# Patient Record
Sex: MALE | Race: WHITE | NOT HISPANIC OR LATINO | Employment: FULL TIME | URBAN - METROPOLITAN AREA
[De-identification: names, ages, dates, MRNs, and addresses within clinical notes are randomized per-mention and may not be internally consistent; named-entity substitution may affect disease eponyms.]

---

## 2021-10-22 ENCOUNTER — HOSPITAL ENCOUNTER (EMERGENCY)
Facility: CLINIC | Age: 46
Discharge: LEFT BEFORE DISCHARGE | End: 2021-10-22
Attending: EMERGENCY MEDICINE
Payer: COMMERCIAL

## 2021-10-22 VITALS
DIASTOLIC BLOOD PRESSURE: 116 MMHG | TEMPERATURE: 97.4 F | HEART RATE: 112 BPM | OXYGEN SATURATION: 97 % | HEIGHT: 71 IN | WEIGHT: 235 LBS | SYSTOLIC BLOOD PRESSURE: 141 MMHG | BODY MASS INDEX: 32.9 KG/M2 | RESPIRATION RATE: 20 BRPM

## 2021-10-22 ASSESSMENT — MIFFLIN-ST. JEOR: SCORE: 1978.08

## 2021-10-22 NOTE — ED TRIAGE NOTES
Pt picked up from [facility] Arrived there today. Last drink was yesterday. Today had onset of RLQ abdominal pain and dizziness. Was given Valium 5mg. Hx of pancreatitis. Also reports a cough with blood tinged sputum.

## 2021-11-25 ENCOUNTER — HOSPITAL ENCOUNTER (EMERGENCY)
Facility: CLINIC | Age: 46
Discharge: HOME | End: 2021-11-25
Attending: EMERGENCY MEDICINE | Admitting: EMERGENCY MEDICINE
Payer: COMMERCIAL

## 2021-11-25 VITALS
RESPIRATION RATE: 18 BRPM | HEART RATE: 108 BPM | TEMPERATURE: 98.3 F | SYSTOLIC BLOOD PRESSURE: 129 MMHG | WEIGHT: 243 LBS | DIASTOLIC BLOOD PRESSURE: 81 MMHG | OXYGEN SATURATION: 100 % | HEIGHT: 70 IN | BODY MASS INDEX: 34.79 KG/M2

## 2021-11-25 DIAGNOSIS — F10.10 NONDEPENDENT ALCOHOL ABUSE, EPISODIC DRINKING BEHAVIOR: ICD-10-CM

## 2021-11-25 LAB
ALCOHOL BREATH TEST: 0.03 (ref 0–0.01)
AMPHETAMINES UR QL SCN: NORMAL
BARBITURATES UR QL: NORMAL
BENZODIAZ UR QL: NORMAL
CANNABINOIDS UR QL SCN: NORMAL
COCAINE UR QL: NORMAL
ETHANOL UR QL SCN: NORMAL
OPIATES UR QL SCN: NORMAL

## 2021-11-25 PROCEDURE — 99282 EMERGENCY DEPT VISIT SF MDM: CPT | Performed by: EMERGENCY MEDICINE

## 2021-11-25 PROCEDURE — 80307 DRUG TEST PRSMV CHEM ANLYZR: CPT | Performed by: EMERGENCY MEDICINE

## 2021-11-25 PROCEDURE — 99283 EMERGENCY DEPT VISIT LOW MDM: CPT | Performed by: EMERGENCY MEDICINE

## 2021-11-25 RX ORDER — OMEPRAZOLE 20 MG/1
20 TABLET, DELAYED RELEASE ORAL DAILY
COMMUNITY

## 2021-11-25 ASSESSMENT — ENCOUNTER SYMPTOMS
HEADACHES: 1
FEVER: 0
COUGH: 0
SHORTNESS OF BREATH: 0
ABDOMINAL PAIN: 0
DYSURIA: 0
VOMITING: 0
NAUSEA: 1

## 2021-11-25 ASSESSMENT — MIFFLIN-ST. JEOR: SCORE: 1993.49

## 2021-11-25 NOTE — DISCHARGE INSTRUCTIONS
Please  keep your appointment for intake/treatment on December 7.   Please return the emergency department if you develop withdrawal symptoms including shakiness, sweating, nausea, vomiting, any seizures, any other concerns.  You can also call any of the numbers on the list provided to inquire about treatment.

## 2021-11-25 NOTE — ED PROVIDER NOTES
"    [facility] [place] ([facility])       11/25/21  History     Chief Complaint   Patient presents with     Alcohol Problem     Here for detox from alcohol, drinks 6 pack of beer daily, last drink this morning     The history is provided by the patient and medical records.   The patient's mother is also here with him and she provides some of the history as well.    Imtiaz Garcia is a 46 year old male with a past medical history significant for alcohol abuse, pancreatitis, diabetes mellitus type 2, GERD, hypertension and anxiety who presents to the Emergency Department for evaluation of alcohol intoxication requesting detox.      Patient reports that he got out of alcohol treatment on 11/22.  He states that he relapsed yesterday and his first drink was yesterday and his last drink was this morning.  He notes that he drinks about 6 beers and vodka a day. He says that he drank this morning by choice and not to treat withdrawal symptoms.  He states that he has never had withdrawal symptoms in the past including no history of shaking, alcohol withdrawal seizures, DTs or vomiting.  When asked if the patient is requesting detox today, patient replied with \"I guess\".  He says that he has been to treatment twice in the past; once in March 2021 and again this month (November).  He adds that he had detox treatment in March and had no withdrawal symptoms at this time.  Patient notes that he was treated at \"[facility]\" located in [place], [place] and endorses that he he would not like to go back to this facility. Patient's mother in the room stated patient had a headache and nausea this morning, and patient states that the headache has resolved and he feels good while in the ED today.  Patient denies suicidal ideation and homicidal ideation.  Patient denies other drug use.  Patient denies fever, cough, chest pain, abdominal pain, vomiting, and chills.    Past Medical History:   Diagnosis Date     Substance " "abuse (H)        Past Surgical History:   Procedure Laterality Date     CHOLECYSTECTOMY         No family history on file.    Social History     Tobacco Use     Smoking status: Current Every Day Smoker     Packs/day: 1.00     Smokeless tobacco: Never Used   Substance Use Topics     Alcohol use: Yes     Alcohol/week: 6.0 standard drinks     Types: 6 Cans of beer per week       No current facility-administered medications for this encounter.     Current Outpatient Medications   Medication     omeprazole (PRILOSEC OTC) 20 MG EC tablet      No Known Allergies     I have reviewed the Medications, Allergies, Past Medical and Surgical History, and Social History in the Epic system.    Review of Systems   Constitutional: Negative for fever.   Respiratory: Negative for cough and shortness of breath.    Cardiovascular: Negative for chest pain.   Gastrointestinal: Positive for nausea. Negative for abdominal pain and vomiting.   Genitourinary: Negative for dysuria.   Neurological: Positive for headaches (resolved).   All other systems reviewed and are negative.    A complete review of systems was performed with pertinent positives and negatives noted in the HPI, and all other systems negative.    Physical Exam   BP: (!) 148/88  Pulse: 116  Temp: 98.3  F (36.8  C)  Resp: 16  Height: 177.8 cm (5' 10\")  Weight: 110.2 kg (243 lb)  SpO2: 97 %      Physical Exam  GEN:  Well developed, no acute distress  HEENT:  EOMI, Mucous membranes are moist.   Cardio:  RRR, no murmur, radial pulses equal bilaterally  PULM:  Lungs clear, good air movement, no wheezes, rales   Abd:  Soft, normal bowel sounds, no focal tenderness  Musculoskeletal:  normal range of motion, no lower extremity swelling or calf tenderness  Neuro:  Alert and oriented X3, Follows commands, moving all extremities spontaneously   Skin:  Warm, dry  Psychiatric: Normal mood and affect, denies suicidal ideation, homicidal ideation, auditory or visual hallucinations      ED " Course     At 2:46 PM the patient was seen and examined by Enriqueta Stubbs MD in Room ED09.        Procedures         Patient reports that he really does not get any withdrawal symptoms.  Is possible that he has some mild withdrawal given his slight tachycardia, however, patient is not noticing this.  He is not really interested in detox, he is looking more for treatment options.  He was given the option to see the  so that they can do the assessment and make an appointment for him for chemical dependency intake, however, the patient is declining the assessment.         Results for orders placed or performed during the hospital encounter of 11/25/21 (from the past 24 hour(s))   Alcohol breath test POCT   Result Value Ref Range    Alcohol Breath Test 0.026 (A) 0.00 - 0.01     Medications - No data to display          Assessments & Plan (with Medical Decision Making)   Patient presents with his mother for alcohol use.  He reports that he does not have any withdrawal symptoms, so would not qualify for inpatient detox.  Patient was advised to follow-up with one of the treatment programs on the list given to him today.  Patient's mother was given a second copy of the list as well.  Patient tells me he actually has an intake appointment for treatment on December 7.  Patient has no other complaints or concerns today.    I have reviewed the nursing notes.    I have reviewed the findings, diagnosis, plan and need for follow up with the patient.    New Prescriptions    No medications on file       Final diagnoses:   Nondependent alcohol abuse, episodic drinking behavior       IJudit am serving as a trained medical scribe to document services personally performed by Enriqueta Stubbs MD, based on the provider's statements to me.      I, Enriqueta Stubbs MD, was physically present and have reviewed and verified the accuracy of this note documented by Judit Aguirre.     Enriqueta Kemp  MD [place]  11/25/2021   [facility] EMERGENCY DEPARTMENT     Enriqueta Stubbs MD  11/25/21 1525

## 2021-12-12 LAB
ATRIAL RATE - MUSE: 108 BPM
DIASTOLIC BLOOD PRESSURE - MUSE: NORMAL MMHG
INTERPRETATION ECG - MUSE: NORMAL
P AXIS - MUSE: 51 DEGREES
PR INTERVAL - MUSE: 160 MS
QRS DURATION - MUSE: 78 MS
QT - MUSE: 344 MS
QTC - MUSE: 460 MS
R AXIS - MUSE: 39 DEGREES
SYSTOLIC BLOOD PRESSURE - MUSE: NORMAL MMHG
T AXIS - MUSE: 53 DEGREES
VENTRICULAR RATE- MUSE: 108 BPM